# Patient Record
Sex: FEMALE
[De-identification: names, ages, dates, MRNs, and addresses within clinical notes are randomized per-mention and may not be internally consistent; named-entity substitution may affect disease eponyms.]

---

## 2021-02-17 ENCOUNTER — RX ONLY (OUTPATIENT)
Age: 41
Setting detail: RX ONLY
End: 2021-02-17

## 2021-02-17 ENCOUNTER — APPOINTMENT (RX ONLY)
Dept: URBAN - METROPOLITAN AREA CLINIC 129 | Facility: CLINIC | Age: 41
Setting detail: DERMATOLOGY
End: 2021-02-17

## 2021-02-17 DIAGNOSIS — L30.9 DERMATITIS, UNSPECIFIED: ICD-10-CM

## 2021-02-17 DIAGNOSIS — L28.0 LICHEN SIMPLEX CHRONICUS: ICD-10-CM

## 2021-02-17 PROCEDURE — 11102 TANGNTL BX SKIN SINGLE LES: CPT

## 2021-02-17 PROCEDURE — ? BIOPSY BY SHAVE METHOD

## 2021-02-17 PROCEDURE — 99203 OFFICE O/P NEW LOW 30 MIN: CPT | Mod: 25

## 2021-02-17 PROCEDURE — ? COUNSELING

## 2021-02-17 PROCEDURE — ? ADDITIONAL NOTES

## 2021-02-17 PROCEDURE — ? PRESCRIPTION

## 2021-02-17 RX ORDER — TACROLIMUS 1 MG/G
OINTMENT TOPICAL
Qty: 1 | Refills: 3 | Status: ERX | COMMUNITY
Start: 2021-02-17

## 2021-02-17 RX ORDER — BETAMETHASONE DIPROPIONATE 0.5 MG/G
OINTMENT TOPICAL BID
Qty: 1 | Refills: 2 | Status: ERX | COMMUNITY
Start: 2021-02-17

## 2021-02-17 RX ORDER — TACROLIMUS 1 MG/G
OINTMENT TOPICAL
Qty: 1 | Refills: 3 | Status: ERX

## 2021-02-17 RX ORDER — BETAMETHASONE DIPROPIONATE 0.5 MG/G
OINTMENT TOPICAL BID
Qty: 1 | Refills: 2 | Status: ERX

## 2021-02-17 RX ADMIN — TACROLIMUS: 1 OINTMENT TOPICAL at 00:00

## 2021-02-17 RX ADMIN — BETAMETHASONE DIPROPIONATE: 0.5 OINTMENT TOPICAL at 00:00

## 2021-02-17 ASSESSMENT — LOCATION SIMPLE DESCRIPTION DERM
LOCATION SIMPLE: LEFT HAND
LOCATION SIMPLE: LEFT BUTTOCK
LOCATION SIMPLE: RIGHT BUTTOCK
LOCATION SIMPLE: GROIN
LOCATION SIMPLE: UPPER BACK
LOCATION SIMPLE: LEFT THIGH
LOCATION SIMPLE: MONS PUBIS
LOCATION SIMPLE: LEFT PRETIBIAL REGION
LOCATION SIMPLE: RIGHT PRETIBIAL REGION

## 2021-02-17 ASSESSMENT — LOCATION DETAILED DESCRIPTION DERM
LOCATION DETAILED: RIGHT MONS PUBIS
LOCATION DETAILED: RIGHT BUTTOCK
LOCATION DETAILED: LEFT MONS PUBIS
LOCATION DETAILED: RIGHT DISTAL PRETIBIAL REGION
LOCATION DETAILED: LEFT ANTERIOR PROXIMAL THIGH
LOCATION DETAILED: LEFT ULNAR DORSAL HAND
LOCATION DETAILED: SUPERIOR THORACIC SPINE
LOCATION DETAILED: LEFT BUTTOCK
LOCATION DETAILED: MONS PUBIS
LOCATION DETAILED: LEFT DISTAL PRETIBIAL REGION

## 2021-02-17 ASSESSMENT — LOCATION ZONE DERM
LOCATION ZONE: TRUNK
LOCATION ZONE: VULVA
LOCATION ZONE: HAND
LOCATION ZONE: LEG

## 2021-02-17 NOTE — PROCEDURE: BIOPSY BY SHAVE METHOD
Validate Lesion Size: No
Depth Of Biopsy: dermis
Curettage Text: The wound bed was treated with curettage after the biopsy was performed.
Dressing: bandage
Biopsy Type: H and E
Information: Selecting Yes will display possible errors in your note based on the variables you have selected. This validation is only offered as a suggestion for you. PLEASE NOTE THAT THE VALIDATION TEXT WILL BE REMOVED WHEN YOU FINALIZE YOUR NOTE. IF YOU WANT TO FAX A PRELIMINARY NOTE YOU WILL NEED TO TOGGLE THIS TO 'NO' IF YOU DO NOT WANT IT IN YOUR FAXED NOTE.
Billing Type: Third-Party Bill
Was A Bandage Applied: Yes
Anesthesia Type: 1% lidocaine with epinephrine
Silver Nitrate Text: The wound bed was treated with silver nitrate after the biopsy was performed.
Anesthesia Volume In Cc: 0.5
Biopsy Method: scissors
Consent: Written consent was obtained and risks were reviewed including but not limited to scarring, infection, bleeding, scabbing, incomplete removal, nerve damage and allergy to anesthesia.
X Size Of Lesion In Cm: 0
Electrodesiccation And Curettage Text: The wound bed was treated with electrodesiccation and curettage after the biopsy was performed.
Notification Instructions: Patient will be notified of biopsy results. However, patient instructed to call the office if not contacted within 2 weeks.
Detail Level: Detailed
Type Of Destruction Used: Curettage
Post-Care Instructions: I reviewed with the patient in detail post-care instructions. Patient is to keep the biopsy site dry overnight, and then apply bacitracin twice daily until healed. Patient may apply hydrogen peroxide soaks to remove any crusting.
Hemostasis: Electrocautery
Wound Care: Petrolatum
Electrodesiccation Text: The wound bed was treated with electrodesiccation after the biopsy was performed.
Cryotherapy Text: The wound bed was treated with cryotherapy after the biopsy was performed.

## 2021-03-03 ENCOUNTER — APPOINTMENT (RX ONLY)
Dept: URBAN - METROPOLITAN AREA CLINIC 129 | Facility: CLINIC | Age: 41
Setting detail: DERMATOLOGY
End: 2021-03-03

## 2021-03-03 DIAGNOSIS — L28.0 LICHEN SIMPLEX CHRONICUS: ICD-10-CM | Status: RESOLVING

## 2021-03-03 DIAGNOSIS — L81.0 POSTINFLAMMATORY HYPERPIGMENTATION: ICD-10-CM

## 2021-03-03 DIAGNOSIS — L87.1 REACTIVE PERFORATING COLLAGENOSIS: ICD-10-CM | Status: RESOLVING

## 2021-03-03 PROCEDURE — ? ADDITIONAL NOTES

## 2021-03-03 PROCEDURE — ? TREATMENT REGIMEN

## 2021-03-03 PROCEDURE — ? REFERRAL CORRESPONDENCE

## 2021-03-03 PROCEDURE — ? COUNSELING

## 2021-03-03 PROCEDURE — 99213 OFFICE O/P EST LOW 20 MIN: CPT

## 2021-03-03 ASSESSMENT — LOCATION SIMPLE DESCRIPTION DERM
LOCATION SIMPLE: LEFT PRETIBIAL REGION
LOCATION SIMPLE: RIGHT PRETIBIAL REGION
LOCATION SIMPLE: GENITALIA

## 2021-03-03 ASSESSMENT — LOCATION ZONE DERM
LOCATION ZONE: LEG
LOCATION ZONE: TRUNK

## 2021-03-03 ASSESSMENT — LOCATION DETAILED DESCRIPTION DERM
LOCATION DETAILED: LEFT PROXIMAL PRETIBIAL REGION
LOCATION DETAILED: GENITALIA
LOCATION DETAILED: LEFT DISTAL PRETIBIAL REGION
LOCATION DETAILED: RIGHT DISTAL PRETIBIAL REGION

## 2021-03-03 NOTE — PROCEDURE: TREATMENT REGIMEN
Continue Regimen: Protopic as needed
Continue Regimen: Diprolene as directed
Plan: If no improvement despite treatment, consider UVB phototherapy
Detail Level: Simple
Detail Level: Zone

## 2021-03-03 NOTE — PROCEDURE: ADDITIONAL NOTES
Render Risk Assessment In Note?: no
Additional Notes: Discussed biopsy results. Pt states she just had blood work done recently, she will provide us with her results. Reports her kidney function was normal, patient reports her diabetes hasn’t been well controlled if she misses her insulin dose. Will refer to nephrology if kidney function is abnormal.
Detail Level: Zone

## 2021-04-07 ENCOUNTER — APPOINTMENT (RX ONLY)
Dept: URBAN - METROPOLITAN AREA CLINIC 129 | Facility: CLINIC | Age: 41
Setting detail: DERMATOLOGY
End: 2021-04-07

## 2021-04-07 DIAGNOSIS — L81.0 POSTINFLAMMATORY HYPERPIGMENTATION: ICD-10-CM

## 2021-04-07 DIAGNOSIS — L87.1 REACTIVE PERFORATING COLLAGENOSIS: ICD-10-CM | Status: IMPROVED

## 2021-04-07 DIAGNOSIS — L90.5 SCAR CONDITIONS AND FIBROSIS OF SKIN: ICD-10-CM

## 2021-04-07 DIAGNOSIS — L85.3 XEROSIS CUTIS: ICD-10-CM

## 2021-04-07 DIAGNOSIS — L28.0 LICHEN SIMPLEX CHRONICUS: ICD-10-CM

## 2021-04-07 PROCEDURE — ? ADDITIONAL NOTES

## 2021-04-07 PROCEDURE — ? TREATMENT REGIMEN

## 2021-04-07 PROCEDURE — ? COUNSELING

## 2021-04-07 PROCEDURE — 99213 OFFICE O/P EST LOW 20 MIN: CPT

## 2021-04-07 ASSESSMENT — LOCATION DETAILED DESCRIPTION DERM
LOCATION DETAILED: LEFT PROXIMAL PRETIBIAL REGION
LOCATION DETAILED: MONS PUBIS
LOCATION DETAILED: LEFT DISTAL PRETIBIAL REGION
LOCATION DETAILED: RIGHT DISTAL PRETIBIAL REGION
LOCATION DETAILED: RIGHT PROXIMAL PRETIBIAL REGION

## 2021-04-07 ASSESSMENT — LOCATION SIMPLE DESCRIPTION DERM
LOCATION SIMPLE: RIGHT PRETIBIAL REGION
LOCATION SIMPLE: GROIN
LOCATION SIMPLE: LEFT PRETIBIAL REGION

## 2021-04-07 ASSESSMENT — LOCATION ZONE DERM
LOCATION ZONE: LEG
LOCATION ZONE: VULVA

## 2021-04-07 NOTE — PROCEDURE: ADDITIONAL NOTES
Detail Level: Zone
Additional Notes: Discussed allergy patch testing.  .
Render Risk Assessment In Note?: no
Additional Notes: Requested labs to check kidney function, patient will call her PCP to have her labs sent from March. Patient reports her kidney function and UA was normal, her diabetes hasn’t been well controlled but her doctor is going to change her insulin.
Additional Notes: Recommended Vaseline ointment .

## 2021-04-09 ENCOUNTER — APPOINTMENT (RX ONLY)
Dept: URBAN - METROPOLITAN AREA CLINIC 129 | Facility: CLINIC | Age: 41
Setting detail: DERMATOLOGY
End: 2021-04-09

## 2021-04-09 DIAGNOSIS — L87.1 REACTIVE PERFORATING COLLAGENOSIS: ICD-10-CM

## 2021-04-09 PROCEDURE — ? ORDER TESTS

## 2021-05-10 ENCOUNTER — APPOINTMENT (RX ONLY)
Dept: URBAN - METROPOLITAN AREA CLINIC 129 | Facility: CLINIC | Age: 41
Setting detail: DERMATOLOGY
End: 2021-05-10

## 2021-05-10 DIAGNOSIS — L23.9 ALLERGIC CONTACT DERMATITIS, UNSPECIFIED CAUSE: ICD-10-CM

## 2021-05-10 PROCEDURE — 95044 PATCH/APPLICATION TESTS: CPT

## 2021-05-10 PROCEDURE — ? PATCH TESTING

## 2021-05-12 ENCOUNTER — APPOINTMENT (RX ONLY)
Dept: URBAN - METROPOLITAN AREA CLINIC 129 | Facility: CLINIC | Age: 41
Setting detail: DERMATOLOGY
End: 2021-05-12

## 2021-05-12 DIAGNOSIS — L23.9 ALLERGIC CONTACT DERMATITIS, UNSPECIFIED CAUSE: ICD-10-CM

## 2021-05-12 DIAGNOSIS — L28.0 LICHEN SIMPLEX CHRONICUS: ICD-10-CM | Status: INADEQUATELY CONTROLLED

## 2021-05-12 PROCEDURE — ? TREATMENT REGIMEN

## 2021-05-12 PROCEDURE — ? COUNSELING

## 2021-05-12 PROCEDURE — ? CORE ACDS PATCH TEST READING

## 2021-05-12 PROCEDURE — 11900 INJECT SKIN LESIONS </W 7: CPT

## 2021-05-12 PROCEDURE — ? INTRALESIONAL KENALOG

## 2021-05-12 PROCEDURE — ? PRESCRIPTION

## 2021-05-12 PROCEDURE — 99214 OFFICE O/P EST MOD 30 MIN: CPT | Mod: 25

## 2021-05-12 RX ORDER — HYDROCORTISONE 25 MG/G
OINTMENT TOPICAL
Qty: 1 | Refills: 2 | Status: ERX | COMMUNITY
Start: 2021-05-12

## 2021-05-12 RX ADMIN — HYDROCORTISONE: 25 OINTMENT TOPICAL at 00:00

## 2021-05-12 ASSESSMENT — LOCATION DETAILED DESCRIPTION DERM
LOCATION DETAILED: LEFT LABIUM MAJUS
LOCATION DETAILED: RIGHT MONS PUBIS
LOCATION DETAILED: LEFT MONS PUBIS
LOCATION DETAILED: RIGHT LABIUM MAJUS

## 2021-05-12 ASSESSMENT — LOCATION SIMPLE DESCRIPTION DERM
LOCATION SIMPLE: MONS PUBIS
LOCATION SIMPLE: LABIA MAJORA

## 2021-05-12 ASSESSMENT — LOCATION ZONE DERM: LOCATION ZONE: VULVA

## 2021-05-12 NOTE — PROCEDURE: INTRALESIONAL KENALOG
Detail Level: Zone
X Size Of Lesion In Cm (Optional): 0
Validate Note Data When Using Inventory: Yes
Total Volume (Ccs): 1.8
Kenalog Preparation: Kenalog
Administered By (Optional): Milena Adames, NP
Include Z78.9 (Other Specified Conditions Influencing Health Status) As An Associated Diagnosis?: No
Concentration Of Kenalog Solution Injected (Mg/Ml): 5.0
Ndc# For Kenalog Only: mixed
Medical Necessity Clause: This procedure was medically necessary because the lesions that were treated were:
Consent: The risks of atrophy were reviewed with the patient.

## 2021-05-12 NOTE — PROCEDURE: CORE ACDS PATCH TEST READING
Name Of Allergen 60: Benzalkonium chloride 0.1% aq
Allergen 42 Reaction: no reaction
Name Of Allergen 19: Methyldibromoglutaronitrile 0.5% pet
Name Of Allergen 54: Chloroxylenol (PCMX) 1% pet
Name Of Allergen 71: Triamcinolone 1% pet
Name Of Allergen 56: Tosylamide formaldehyde resin 10% pet
Name Of Allergen 1: Nickel sulfate 2.5% pet
Name Of Allergen 44: Oleamidopropyl dimethylamine 0.1% aq
Name Of Allergen 50: Ethyl acrylate 0.1% pet
Name Of Allergen 72: Clobetasol-17-propionate 1% pet
Name Of Allergen 68: n,n-Diphenylguanidine 1% pet
Name Of Allergen 9: Methylisothiazolinone 0.2% aq
Name Of Allergen 31: Hydrocortisone-17-butyrate 1% pet
Name Of Allergen 40: Cocamidopropyl betaine 1% aq
Show Allergen Counseling In The Note?: No
Name Of Allergen 74: Ethyl cyanoacrylate 10% pet
Name Of Allergen 2: Lanolin alcohol (Amerchol 101) 50% pet
Name Of Allergen 14: Epoxy resin 1% pet
Name Of Allergen 64: Jamel 2% pet
Name Of Allergen 33: Bacitracin 20% pet
Name Of Allergen 62: Sodium benzoate 5% pet
Name Of Allergen 24: Thiuram mix 1% pet
Name Of Allergen 12: Cobalt chloride 1% pet
Name Of Allergen 75: Phenoxyethanol 1% pet
Name Of Allergen 39: Polymyxin B sulfate 3% pet
Name Of Allergen 69: Cetyl steryl alcohol 20% pet
Name Of Allergen 18: Quaternium 15 2% pet
Name Of Allergen 4: Potassium dichromate 0.25% pet
Name Of Allergen 53: Propolis 10% pet
Name Of Allergen 80: Benzyl alcohol 10% soft
Name Of Allergen 17: Tetracaine hydrochloride 5% pet
Detail Level: Zone
Name Of Allergen 11: Ethylenediamine dihydrochloride 1% pet
Name Of Allergen 70: Ethylhexylglycerin 5% pet
Name Of Allergen 7: Colophony 20% pet
Name Of Allergen 77: Benzoic acid 5% pet
Name Of Allergen 13: b-ownn-Iciqpmqrcae formaldehyde resin 1% pet
Name Of Allergen 67: Sorbitan sesquioleate 20% pet
Name Of Allergen 41: Mixed dialkyl thioureas 1% pet
Name Of Allergen 26: Benzocaine 5% pet
Name Of Allergen 29: Imidazolidinyl urea 2% pet
Name Of Allergen 61: 2-Hydroxy-4-methoxybenzophenone-5-sulfonic acid (benzophenone-4) 2% pet
Name Of Allergen 15: Carba mix 3% pet
Name Of Allergen 3: Neomycin 20% pet
Name Of Allergen 22: Mercapto mix 1% pet
Name Of Allergen 76: Disperse Orange 3 1% pet
Name Of Allergen 48: Cinnamic aldehyde 1% pet
Name Of Allergen 58: Cocamide [place] 0.5% pet
Name Of Allergen 23: 2-Bromo-2-nitropropane-1,3-diol 0.5% pet
Name Of Allergen 42: 3-(Dimethylamino)-propylamine 1% aq
Show Negative Results In The Note?: Yes
Name Of Allergen 57: Sesquiterpene lactone mix 0.1% pet
What Reading Time Point?: 48 hour
Name Of Allergen 25: Diazolidinyl urea 1% pet
Name Of Allergen 63: Sorbic acid 2% pet
Name Of Allergen 10: Balsam of [place] 25% pet
Name Of Allergen 16: Black rubber mix 0.6% pet
Name Of Allergen 52: Chlorhexidine digluconate 0.5% aq
Name Of Allergen 8: Paraben mix 12% pet
Name Of Allergen 65: Compositae mix II 5% pet
Name Of Allergen 6: Fragrance mix I 8% pet
Name Of Allergen 47: Lavender absolute 2% pet
Name Of Allergen 28: Gold sodium thiosulfate 2% pet
Name Of Allergen 21: Formaldehyde 2% aq
Name Of Allergen 73: Amidoamine 0.1% aq
Name Of Allergen 30: Budesonide 0.1% pet
Name Of Allergen 46: Methyl methacrylate 2% pet
Name Of Allergen 34: Fragrance mix II 14% pet
Name Of Allergen 49: D/L-?-Tocopherol 100%
Name Of Allergen 55: 2-Hydroxy-4-methoxybenzophenone (benzophenone-3) 10% pet
Name Of Allergen 35: Disperse blue 106/124 mix 1% pet
Name Of Allergen 37: Propylene glycol 100% aq
Name Of Allergen 79: 2-Ethylhexyl-4-methoxycinnamate 10% pet
Name Of Allergen 20: p-Phenylenediamine 1% pet
Name Of Allergen 45: Decyl glucoside 5% pet
Name Of Allergen 5: DMDM hydantoin 1% pet
Name Of Allergen 66: Ethyleneurea melamine-formaldehyde 5% pet
Name Of Allergen 38: Iodopropynyl butylcarbamate 0.1% pet
Name Of Allergen 59: 4-Chloro-3-cresol ([facility]) 1% pet
Name Of Allergen 43: Hydroxyethyl methacrylate 2% pet
Name Of Allergen 27: Tixocortol-21-pivalate
Number Of Patches Read: 80
Name Of Allergen 36: Lidocaine 15% pet
Name Of Allergen 32: Mercaptobenzothiazole 1% pet
Name Of Allergen 78: 2,6-Ditert-butyl-4-cresol (BHT) 2% pet
Name Of Allergen 51: Tea tree oil 5% pet

## 2021-05-14 ENCOUNTER — APPOINTMENT (RX ONLY)
Dept: URBAN - METROPOLITAN AREA CLINIC 129 | Facility: CLINIC | Age: 41
Setting detail: DERMATOLOGY
End: 2021-05-14

## 2021-05-14 DIAGNOSIS — L23.9 ALLERGIC CONTACT DERMATITIS, UNSPECIFIED CAUSE: ICD-10-CM

## 2021-05-14 PROCEDURE — 99212 OFFICE O/P EST SF 10 MIN: CPT

## 2021-05-14 PROCEDURE — ? COUNSELING

## 2021-05-14 PROCEDURE — ? CORE ACDS PATCH TEST READING

## 2021-05-14 NOTE — PROCEDURE: CORE ACDS PATCH TEST READING
Name Of Allergen 18: Quaternium 15 2% pet
Allergen 50 Reaction: no reaction
Name Of Allergen 8: Paraben mix 12% pet
Name Of Allergen 2: Lanolin alcohol (Amerchol 101) 50% pet
Name Of Allergen 14: Epoxy resin 1% pet
Name Of Allergen 50: Ethyl acrylate 0.1% pet
Show Negative Results In The Note?: Yes
Name Of Allergen 16: Black rubber mix 0.6% pet
Name Of Allergen 39: Polymyxin B sulfate 3% pet
Name Of Allergen 10: Balsam of [place] 25% pet
Name Of Allergen 41: Mixed dialkyl thioureas 1% pet
Name Of Allergen 19: Methyldibromoglutaronitrile 0.5% pet
Name Of Allergen 20: p-Phenylenediamine 1% pet
Detail Level: Zone
Name Of Allergen 13: c-xqzk-Mbpvzeesdlj formaldehyde resin 1% pet
Name Of Allergen 78: 2,6-Ditert-butyl-4-cresol (BHT) 2% pet
Name Of Allergen 6: Fragrance mix I 8% pet
Name Of Allergen 34: Fragrance mix II 14% pet
Name Of Allergen 60: Benzalkonium chloride 0.1% aq
Name Of Allergen 61: 2-Hydroxy-4-methoxybenzophenone-5-sulfonic acid (benzophenone-4) 2% pet
Name Of Allergen 33: Bacitracin 20% pet
Show Allergen Counseling In The Note?: No
Name Of Allergen 31: Hydrocortisone-17-butyrate 1% pet
Name Of Allergen 52: Chlorhexidine digluconate 0.5% aq
Name Of Allergen 42: 3-(Dimethylamino)-propylamine 1% aq
Name Of Allergen 30: Budesonide 0.1% pet
Name Of Allergen 66: Ethyleneurea melamine-formaldehyde 5% pet
Name Of Allergen 28: Gold sodium thiosulfate 2% pet
Name Of Allergen 68: n,n-Diphenylguanidine 1% pet
Name Of Allergen 4: Potassium dichromate 0.25% pet
Name Of Allergen 69: Cetyl steryl alcohol 20% pet
Name Of Allergen 59: 4-Chloro-3-cresol ([facility]) 1% pet
Name Of Allergen 17: Tetracaine hydrochloride 5% pet
Name Of Allergen 51: Tea tree oil 5% pet
What Reading Time Point?: 48 hour
Name Of Allergen 72: Clobetasol-17-propionate 1% pet
Name Of Allergen 80: Benzyl alcohol 10% soft
Name Of Allergen 75: Phenoxyethanol 1% pet
Name Of Allergen 55: 2-Hydroxy-4-methoxybenzophenone (benzophenone-3) 10% pet
Name Of Allergen 40: Cocamidopropyl betaine 1% aq
Name Of Allergen 23: 2-Bromo-2-nitropropane-1,3-diol 0.5% pet
Number Of Patches Read: 80
Name Of Allergen 1: Nickel sulfate 2.5% pet
Name Of Allergen 47: Lavender absolute 2% pet
Name Of Allergen 22: Mercapto mix 1% pet
Name Of Allergen 12: Cobalt chloride 1% pet
Name Of Allergen 11: Ethylenediamine dihydrochloride 1% pet
Name Of Allergen 70: Ethylhexylglycerin 5% pet
Name Of Allergen 38: Iodopropynyl butylcarbamate 0.1% pet
Name Of Allergen 64: Jamel 2% pet
Name Of Allergen 54: Chloroxylenol (PCMX) 1% pet
Name Of Allergen 76: Disperse Orange 3 1% pet
Name Of Allergen 3: Neomycin 20% pet
Name Of Allergen 62: Sodium benzoate 5% pet
Name Of Allergen 36: Lidocaine 15% pet
Name Of Allergen 77: Benzoic acid 5% pet
Name Of Allergen 5: DMDM hydantoin 1% pet
Name Of Allergen 63: Sorbic acid 2% pet
Name Of Allergen 24: Thiuram mix 1% pet
Name Of Allergen 48: Cinnamic aldehyde 1% pet
Name Of Allergen 49: D/L-?-Tocopherol 100%
Name Of Allergen 74: Ethyl cyanoacrylate 10% pet
Name Of Allergen 21: Formaldehyde 2% aq
Name Of Allergen 32: Mercaptobenzothiazole 1% pet
Name Of Allergen 43: Hydroxyethyl methacrylate 2% pet
Name Of Allergen 15: Carba mix 3% pet
Name Of Allergen 67: Sorbitan sesquioleate 20% pet
Name Of Allergen 57: Sesquiterpene lactone mix 0.1% pet
Name Of Allergen 65: Compositae mix II 5% pet
Name Of Allergen 7: Colophony 20% pet
Name Of Allergen 27: Tixocortol-21-pivalate
Name Of Allergen 35: Disperse blue 106/124 mix 1% pet
Name Of Allergen 56: Tosylamide formaldehyde resin 10% pet
Name Of Allergen 79: 2-Ethylhexyl-4-methoxycinnamate 10% pet
Name Of Allergen 45: Decyl glucoside 5% pet
Name Of Allergen 44: Oleamidopropyl dimethylamine 0.1% aq
Name Of Allergen 73: Amidoamine 0.1% aq
Name Of Allergen 58: Cocamide [place] 0.5% pet
Name Of Allergen 9: Methylisothiazolinone 0.2% aq
Name Of Allergen 37: Propylene glycol 100% aq
Name Of Allergen 25: Diazolidinyl urea 1% pet
Name Of Allergen 53: Propolis 10% pet
Name Of Allergen 71: Triamcinolone 1% pet
Name Of Allergen 46: Methyl methacrylate 2% pet
Name Of Allergen 29: Imidazolidinyl urea 2% pet
Name Of Allergen 26: Benzocaine 5% pet

## 2021-06-15 ENCOUNTER — APPOINTMENT (RX ONLY)
Dept: URBAN - METROPOLITAN AREA CLINIC 129 | Facility: CLINIC | Age: 41
Setting detail: DERMATOLOGY
End: 2021-06-15

## 2021-06-15 DIAGNOSIS — L28.0 LICHEN SIMPLEX CHRONICUS: ICD-10-CM | Status: IMPROVED

## 2021-06-15 PROCEDURE — ? COUNSELING

## 2021-06-15 PROCEDURE — ? INTRALESIONAL KENALOG

## 2021-06-15 PROCEDURE — 11900 INJECT SKIN LESIONS </W 7: CPT

## 2021-06-15 ASSESSMENT — LOCATION ZONE DERM: LOCATION ZONE: VULVA

## 2021-06-15 ASSESSMENT — LOCATION SIMPLE DESCRIPTION DERM
LOCATION SIMPLE: LABIA MAJORA
LOCATION SIMPLE: MONS PUBIS

## 2021-06-15 ASSESSMENT — LOCATION DETAILED DESCRIPTION DERM
LOCATION DETAILED: RIGHT MONS PUBIS
LOCATION DETAILED: RIGHT LABIUM MAJUS
LOCATION DETAILED: LEFT MONS PUBIS
LOCATION DETAILED: LEFT LABIUM MAJUS

## 2021-06-15 NOTE — PROCEDURE: INTRALESIONAL KENALOG
Kenalog Preparation: Kenalog
Validate Note Data When Using Inventory: Yes
Detail Level: Zone
Concentration Of Kenalog Solution Injected (Mg/Ml): 5.0
Ndc# For Kenalog Only: mixed
Include Z78.9 (Other Specified Conditions Influencing Health Status) As An Associated Diagnosis?: No
Medical Necessity Clause: This procedure was medically necessary because the lesions that were treated were:
Administered By (Optional): Milena Adames, NP
Total Volume (Ccs): 1.8
Consent: The risks of atrophy were reviewed with the patient.
X Size Of Lesion In Cm (Optional): 0